# Patient Record
Sex: MALE | Race: WHITE
[De-identification: names, ages, dates, MRNs, and addresses within clinical notes are randomized per-mention and may not be internally consistent; named-entity substitution may affect disease eponyms.]

---

## 2018-02-19 ENCOUNTER — HOSPITAL ENCOUNTER (OUTPATIENT)
Dept: HOSPITAL 75 - RAD | Age: 23
End: 2018-02-19
Attending: INTERNAL MEDICINE
Payer: COMMERCIAL

## 2018-02-19 DIAGNOSIS — R10.11: Primary | ICD-10-CM

## 2018-02-19 PROCEDURE — 76705 ECHO EXAM OF ABDOMEN: CPT

## 2018-02-19 NOTE — DIAGNOSTIC IMAGING REPORT
PROCEDURE: US Gallbladder.



TECHNIQUE: Multiple real-time grayscale images were obtained over

the right upper quadrant in various projections.



INDICATION: Right upper quadrant pain.



The liver is normal in size at 15 cm. No discrete liver mass is

identified. The gallbladder is without stones or sludge. No wall

thickening or pericholecystic fluid is seen. There is no biliary

duct dilatation. The pancreas was partially obscured. The right

kidney is unremarkable. There is no ascites.



IMPRESSION: Unremarkable right upper quadrant ultrasound.



Dictated by: 



  Dictated on workstation # [id number]